# Patient Record
Sex: FEMALE | Race: WHITE | Employment: FULL TIME | URBAN - METROPOLITAN AREA
[De-identification: names, ages, dates, MRNs, and addresses within clinical notes are randomized per-mention and may not be internally consistent; named-entity substitution may affect disease eponyms.]

---

## 2018-01-03 ENCOUNTER — OFFICE VISIT (OUTPATIENT)
Dept: URGENT CARE | Facility: URGENT CARE | Age: 48
End: 2018-01-03
Payer: COMMERCIAL

## 2018-01-03 VITALS
TEMPERATURE: 97.8 F | SYSTOLIC BLOOD PRESSURE: 172 MMHG | DIASTOLIC BLOOD PRESSURE: 95 MMHG | WEIGHT: 185.6 LBS | HEART RATE: 90 BPM | RESPIRATION RATE: 20 BRPM

## 2018-01-03 DIAGNOSIS — M54.6 ACUTE RIGHT-SIDED THORACIC BACK PAIN: Primary | ICD-10-CM

## 2018-01-03 DIAGNOSIS — Z72.0 TOBACCO ABUSE: ICD-10-CM

## 2018-01-03 DIAGNOSIS — R03.0 ELEVATED BLOOD PRESSURE READING WITHOUT DIAGNOSIS OF HYPERTENSION: ICD-10-CM

## 2018-01-03 PROCEDURE — 99204 OFFICE O/P NEW MOD 45 MIN: CPT | Performed by: FAMILY MEDICINE

## 2018-01-03 RX ORDER — IBUPROFEN 800 MG/1
800 TABLET, FILM COATED ORAL EVERY 8 HOURS PRN
Qty: 21 TABLET | Refills: 0 | Status: SHIPPED | OUTPATIENT
Start: 2018-01-03 | End: 2018-01-10

## 2018-01-03 RX ORDER — METHOCARBAMOL 750 MG/1
750 TABLET, FILM COATED ORAL 4 TIMES DAILY PRN
Qty: 28 TABLET | Refills: 0 | Status: SHIPPED | OUTPATIENT
Start: 2018-01-03 | End: 2018-01-10

## 2018-01-03 RX ORDER — HYDROCODONE BITARTRATE AND ACETAMINOPHEN 5; 325 MG/1; MG/1
1 TABLET ORAL EVERY 6 HOURS PRN
Qty: 12 TABLET | Refills: 0 | Status: SHIPPED | OUTPATIENT
Start: 2018-01-03 | End: 2018-01-06

## 2018-01-03 NOTE — MR AVS SNAPSHOT
"              After Visit Summary   1/3/2018    Kassie Gallardo    MRN: [id number]           Patient Information     Date Of Birth          1970        Visit Information        Provider Department      1/3/2018 12:55 PM Jarred Pedraza,  [facility]        Today's Diagnoses     Acute right-sided thoracic back pain    -  1       Follow-ups after your visit        Who to contact     If you have questions or need follow up information about today's clinic visit or your schedule please contact [facility] directly at [phone].  Normal or non-critical lab and imaging results will be communicated to you by SPARQhart, letter or phone within 4 business days after the clinic has received the results. If you do not hear from us within 7 days, please contact the clinic through SPARQhart or phone. If you have a critical or abnormal lab result, we will notify you by phone as soon as possible.  Submit refill requests through [employer] or call your pharmacy and they will forward the refill request to us. Please allow 3 business days for your refill to be completed.          Additional Information About Your Visit        MyChart Information     [employer] lets you send messages to your doctor, view your test results, renew your prescriptions, schedule appointments and more. To sign up, go to [email or web address] . Click on \"Log in\" on the left side of the screen, which will take you to the Welcome page. Then click on \"Sign up Now\" on the right side of the page.     You will be asked to enter the access code listed below, as well as some personal information. Please follow the directions to create your username and password.     Your access code is: [email or web address]  Expires: 4/3/2018  2:09 PM     Your access code will  in 90 days. If you need help or a new code, please call your [place] clinic or [phone].        Care EveryWhere ID     This is your Care EveryWhere ID. " This could be used by other organizations to access your [facility] medical records  [id number]        Your Vitals Were     Pulse Temperature Respirations             90 97.8  F (36.6  C) (Oral) 20          Blood Pressure from Last 3 Encounters:   01/03/18 (!) 172/95    Weight from Last 3 Encounters:   01/03/18 185 lb 9.6 oz (84.2 kg)              Today, you had the following     No orders found for display         Today's Medication Changes          These changes are accurate as of: 1/3/18  2:09 PM.  If you have any questions, ask your nurse or doctor.               Start taking these medicines.        Dose/Directions    HYDROcodone-acetaminophen 5-325 MG per tablet   Commonly known as:  NORCO   Used for:  Acute right-sided thoracic back pain   Started by:  Jarred Pedraza DO        Dose:  1 tablet   Take 1 tablet by mouth every 6 hours as needed   Quantity:  12 tablet   Refills:  0       ibuprofen 800 MG tablet   Commonly known as:  ADVIL/MOTRIN   Used for:  Acute right-sided thoracic back pain   Started by:  Jarred Pedraza DO        Dose:  800 mg   Take 1 tablet (800 mg) by mouth every 8 hours as needed for moderate pain   Quantity:  21 tablet   Refills:  0       methocarbamol 750 MG tablet   Commonly known as:  ROBAXIN   Used for:  Acute right-sided thoracic back pain   Started by:  Jarred Pedraza DO        Dose:  750 mg   Take 1 tablet (750 mg) by mouth 4 times daily as needed for muscle spasms   Quantity:  28 tablet   Refills:  0            Where to get your medicines      These medications were sent to [facility] [address] [address]     Phone:  [phone]     ibuprofen 800 MG tablet    methocarbamol 750 MG tablet         Some of these will need a paper prescription and others can be bought over the counter.  Ask your nurse if you have questions.     Bring a paper prescription for each of these medications      HYDROcodone-acetaminophen 5-325 MG per tablet                Primary Care Provider Fax #    Physician No Ref-Primary [phone]       No address on file        Equal Access to Services     SUSI SAAVEDRA : Hadmague aad ku hadjanie Rader, renzo veenagladys, danielle méndez, meliton barajasnya waterman. So [facility] [phone].    ATENCIÓN: Si habla español, tiene a chairez disposición servicios gratuitos de asistencia lingüística. Llame al [phone].    We comply with applicable federal civil rights laws and [place] laws. We do not discriminate on the basis of race, color, national origin, age, disability, sex, sexual orientation, or gender identity.            Thank you!     Thank you for choosing [facility]  for your care. Our goal is always to provide you with excellent care. Hearing back from our patients is one way we can continue to improve our services. Please take a few minutes to complete the written survey that you may receive in the mail after your visit with us. Thank you!             Your Updated Medication List - Protect others around you: Learn how to safely use, store and throw away your medicines at www.disposemymeds.org.          This list is accurate as of: 1/3/18  2:09 PM.  Always use your most recent med list.                   Brand Name Dispense Instructions for use Diagnosis    HYDROcodone-acetaminophen 5-325 MG per tablet    NORCO    12 tablet    Take 1 tablet by mouth every 6 hours as needed    Acute right-sided thoracic back pain       ibuprofen 800 MG tablet    ADVIL/MOTRIN    21 tablet    Take 1 tablet (800 mg) by mouth every 8 hours as needed for moderate pain    Acute right-sided thoracic back pain       methocarbamol 750 MG tablet    ROBAXIN    28 tablet    Take 1 tablet (750 mg) by mouth 4 times daily as needed for muscle spasms    Acute right-sided thoracic back pain

## 2018-01-03 NOTE — PROGRESS NOTES
SUBJECTIVE:  Chief Complaint   Patient presents with     Shoulder Pain     Rt shoulder pain x 1 week ago    .ident who presents with a chief complaint of  right upper back pain.  Symptoms began 1 week(s) ago , are moderate andstill present.  Context:Injury: no  Pain exacerbated by movement and sleeping on the effected side   Relieved bynothing She treated it initially with Tylenol.   This is the first time this type of injury has occurred to this patient.     Past Medical History:   Diagnosis Date     NO ACTIVE PROBLEMS        Past Surgical History:   Procedure Laterality Date     GYN SURGERY         Family History   Problem Relation Age of Onset     Family History Negative No family hx of        Social History   Substance Use Topics     Smoking status: Current Some Day Smoker     Smokeless tobacco: Never Used     Alcohol use Not on file     Review Of Systems  Skin: negative  Eyes: negative  Ears/Nose/Throat: negative  Respiratory: No shortness of breath, dyspnea on exertion, cough, or hemoptysis  Cardiovascular: negative  Gastrointestinal: negative  Genitourinary: negative  Musculoskeletal: as above  Neurologic: negative  Psychiatric: negative  Hematologic/Lymphatic/Immunologic: negative  Endocrine: negative    EXAM: BP (!) 172/95  Pulse 90  Temp 97.8  F (36.6  C) (Oral)  Resp 20  Wt 185 lb 9.6 oz (84.2 kg)    Exam:rt upper back, rhomboids muscles withy spasm and reproducible pain with palpation and rom  GENERAL APPEARANCE: healthy, alert and no distress  NECK: supple, non-tender to palpation, FROM   CHEST: clear to auscultation  CV: regular rate and rhythm  EXTREMITIES: peripheral pulses normal  SKIN: no suspicious lesions or rashes  NEURO: Normal strength and tone, sensory exam grossly normal, mentation intact and speech normal  HEENT clear    ASSESSMENT:     ICD-10-CM    1. Acute right-sided thoracic back pain M54.6 methocarbamol (ROBAXIN) 750 MG tablet     ibuprofen (ADVIL/MOTRIN) 800 MG tablet      HYDROcodone-acetaminophen (NORCO) 5-325 MG per tablet   2. Elevated blood pressure reading without diagnosis of hypertension R03.0    3. Tobacco abuse Z72.0      Quit tobacco  Doubt cardiac or dissection/PE/Respiratory issues    Ice/heat  F/u PCP

## 2018-01-03 NOTE — NURSING NOTE
Chief Complaint   Patient presents with     Shoulder Pain     Rt shoulder pain x 1 week ago        Initial BP (!) 172/95  Pulse 90  Temp 97.8  F (36.6  C) (Oral)  Resp 20  Wt 185 lb 9.6 oz (84.2 kg) There is no height or weight on file to calculate BMI.  Medication Reconciliation: complete

## 2018-01-11 ENCOUNTER — OFFICE VISIT (OUTPATIENT)
Dept: INTERNAL MEDICINE | Facility: CLINIC | Age: 48
End: 2018-01-11
Payer: COMMERCIAL

## 2018-01-11 VITALS
HEART RATE: 102 BPM | BODY MASS INDEX: 27.89 KG/M2 | TEMPERATURE: 98.6 F | HEIGHT: 68 IN | WEIGHT: 184 LBS | DIASTOLIC BLOOD PRESSURE: 100 MMHG | SYSTOLIC BLOOD PRESSURE: 174 MMHG | OXYGEN SATURATION: 98 %

## 2018-01-11 DIAGNOSIS — M25.511 ACUTE PAIN OF RIGHT SHOULDER: ICD-10-CM

## 2018-01-11 DIAGNOSIS — M89.8X1 PAIN OF RIGHT SCAPULA: Primary | ICD-10-CM

## 2018-01-11 DIAGNOSIS — Z23 NEED FOR PROPHYLACTIC VACCINATION AND INOCULATION AGAINST INFLUENZA: ICD-10-CM

## 2018-01-11 DIAGNOSIS — R03.0 ELEVATED BLOOD PRESSURE READING WITHOUT DIAGNOSIS OF HYPERTENSION: ICD-10-CM

## 2018-01-11 PROCEDURE — 99214 OFFICE O/P EST MOD 30 MIN: CPT | Performed by: INTERNAL MEDICINE

## 2018-01-11 RX ORDER — METHOCARBAMOL 750 MG/1
750 TABLET, FILM COATED ORAL 4 TIMES DAILY PRN
Qty: 28 TABLET | Refills: 0 | Status: SHIPPED | OUTPATIENT
Start: 2018-01-11

## 2018-01-11 RX ORDER — DICLOFENAC SODIUM 75 MG/1
75 TABLET, DELAYED RELEASE ORAL 2 TIMES DAILY
Qty: 28 TABLET | Refills: 0 | Status: SHIPPED | OUTPATIENT
Start: 2018-01-11 | End: 2018-01-18

## 2018-01-11 NOTE — MR AVS SNAPSHOT
After Visit Summary   1/11/2018    Kassie Gallardo    MRN: [id number]           Patient Information     Date Of Birth          1970        Visit Information        Provider Department      1/11/2018 1:40 PM Ronak Lyon MD [facility]        Today's Diagnoses     Pain of right scapula    -  1    Acute pain of right shoulder        Need for prophylactic vaccination and inoculation against influenza        Elevated blood pressure reading without diagnosis of hypertension           Follow-ups after your visit        Additional Services     [facility] PT, HAND, AND CHIROPRACTIC REFERRAL       **This order will print in the [facility] Scheduling Office**    Physical Therapy, Hand Therapy and Chiropractic Care are available through:    *[facility]  *[facility]  *[facility]    Call one number to schedule at any of the above locations: [phone].    Your provider has referred you to: Physical Therapy at [facility] or [facility]    Indication/Reason for Referral: Shoulder Pain  Onset of Illness: 12/18  Therapy Orders: Evaluate and Treat  Special Programs: None  Special Request: None    Janell Maciel      Additional Comments for the Therapist or Chiropractor:     Please be aware that coverage of these services is subject to the terms and limitations of your health insurance plan.  Call member services at your health plan with any benefit or coverage questions.      Please bring the following to your appointment:    *Your personal calendar for scheduling future appointments  *Comfortable clothing            SPORTS MEDICINE REFERRAL       Your provider has referred you to:  [facility]: [facility]  [phone]   [email or web address]    Please be aware that coverage of these services is subject to the terms and limitations of  "your health insurance plan.  Call member services at your health plan with any benefit or coverage questions.      Please bring the following to your appointment:    >>   Any x-rays, CTs or MRIs which have been performed.  Contact the facility where they were done to arrange for  prior to your scheduled appointment.    >>   List of current medications   >>   This referral request   >>   Any documents/labs given to you for this referral                  Who to contact     If you have questions or need follow up information about today's clinic visit or your schedule please contact [facility] directly at [phone].  Normal or non-critical lab and imaging results will be communicated to you by MyChart, letter or phone within 4 business days after the clinic has received the results. If you do not hear from us within 7 days, please contact the clinic through [employer] or phone. If you have a critical or abnormal lab result, we will notify you by phone as soon as possible.  Submit refill requests through [employer] or call your pharmacy and they will forward the refill request to us. Please allow 3 business days for your refill to be completed.          Additional Information About Your Visit        MyChart Information     [employer] lets you send messages to your doctor, view your test results, renew your prescriptions, schedule appointments and more. To sign up, go to [email or web address] . Click on \"Log in\" on the left side of the screen, which will take you to the Welcome page. Then click on \"Sign up Now\" on the right side of the page.     You will be asked to enter the access code listed below, as well as some personal information. Please follow the directions to create your username and password.     Your access code is: [email or web address]  Expires: 4/3/2018  2:09 PM     Your access code will  in 90 days. If you need help or a new code, please call your [facility] or " "[phone].        Care EveryWhere ID     This is your Care EveryWhere ID. This could be used by other organizations to access your [facility] medical records  [id number]        Your Vitals Were     Pulse Temperature Height Pulse Oximetry BMI (Body Mass Index)       102 98.6  F (37  C) (Oral) 5' 8\" (1.727 m) 98% 27.98 kg/m2        Blood Pressure from Last 3 Encounters:   01/11/18 (!) 174/100   01/03/18 (!) 172/95    Weight from Last 3 Encounters:   01/11/18 184 lb (83.5 kg)   01/03/18 185 lb 9.6 oz (84.2 kg)              We Performed the Following     FLU VAC, SPLIT VIRUS IM > 3 YO (QUADRIVALENT) [36532]     [facility] PT, HAND, AND CHIROPRACTIC REFERRAL     SPORTS MEDICINE REFERRAL     Vaccine Administration, Initial [20425]          Today's Medication Changes          These changes are accurate as of: 1/11/18  2:39 PM.  If you have any questions, ask your nurse or doctor.               Start taking these medicines.        Dose/Directions    diclofenac 75 MG EC tablet   Commonly known as:  VOLTAREN   Used for:  Pain of right scapula, Acute pain of right shoulder   Started by:  Ronak Lyon MD        Dose:  75 mg   Take 1 tablet (75 mg) by mouth 2 times daily   Quantity:  28 tablet   Refills:  0         These medicines have changed or have updated prescriptions.        Dose/Directions    methocarbamol 750 MG tablet   Commonly known as:  ROBAXIN   This may have changed:    - medication strength  - when to take this  - reasons to take this   Used for:  Pain of right scapula, Acute pain of right shoulder   Changed by:  Ronak Lyon MD        Dose:  750 mg   Take 1 tablet (750 mg) by mouth 4 times daily as needed   Quantity:  28 tablet   Refills:  0            Where to get your medicines      These medications were sent to [facility] [address] [address]     Phone:  [phone]     diclofenac 75 MG EC tablet    methocarbamol 750 MG tablet "                Primary Care Provider    None Specified       No primary provider on file.        Equal Access to Services     SUSI SAAVEDRA : Hadii aad ku hadgracielalo Rader, waisabelarosana heck, allantin robertsmameliton avilez. So [facility] [phone].    ATENCIÓN: Si habla español, tiene a chairez disposición servicios gratuitos de asistencia lingüística. Ll[facility] [phone].    We comply with applicable federal civil rights laws and [place] laws. We do not discriminate on the basis of race, color, national origin, age, disability, sex, sexual orientation, or gender identity.            Thank you!     Thank you for choosing [facility]  for your care. Our goal is always to provide you with excellent care. Hearing back from our patients is one way we can continue to improve our services. Please take a few minutes to complete the written survey that you may receive in the mail after your visit with us. Thank you!             Your Updated Medication List - Protect others around you: Learn how to safely use, store and throw away your medicines at www.disposemymeds.org.          This list is accurate as of: 1/11/18  2:39 PM.  Always use your most recent med list.                   Brand Name Dispense Instructions for use Diagnosis    diclofenac 75 MG EC tablet    VOLTAREN    28 tablet    Take 1 tablet (75 mg) by mouth 2 times daily    Pain of right scapula, Acute pain of right shoulder       IBUPROFEN PO      Take 800 mg by mouth        methocarbamol 750 MG tablet    ROBAXIN    28 tablet    Take 1 tablet (750 mg) by mouth 4 times daily as needed    Pain of right scapula, Acute pain of right shoulder

## 2018-01-11 NOTE — PROGRESS NOTES
"  SUBJECTIVE:   Kassie Gallardo is a 47 year old female who presents to clinic today for the following health issues:    Back/R shoulder  Pain       Duration: 3 weeks        Specific cause: none    Description:   Location of pain: shoulders right  Character of pain: throbbing, shooting pain   Pain radiation:down the R arm   New numbness or weakness in legs, not attributed to pain:  YES, the R arm    Intensity: Currently 7/10    History:   Pain interferes with job: YES  History of back problems: no prior back problems  Any previous MRI or X-rays: None  Sees a specialist for back pain:  No  Therapies tried without relief: cold, heat, massage, muscle relaxants, opioids, sitting and stretch    Alleviating factors:   Improved by: rest, heat and ice    Precipitating factors:  Worsened by: Lifting and Standing    Functional and Psychosocial Screen ([email or web address] STarT Back):      Not performed today    Problem list and histories reviewed & adjusted, as indicated.  Additional history: as documented    Labs reviewed in EPIC    Reviewed and updated as needed this visit by clinical staff  Tobacco  Allergies  Soc Hx      Reviewed and updated as needed this visit by Provider         ROS:  Constitutional, HEENT, cardiovascular, pulmonary, gi and gu systems are negative, except as otherwise noted.      OBJECTIVE:                                                    BP (!) 174/100  Pulse 102  Temp 98.6  F (37  C) (Oral)  Ht 5' 8\" (1.727 m)  Wt 184 lb (83.5 kg)  SpO2 98%  BMI 27.98 kg/m2  Body mass index is 27.98 kg/(m^2).  GENERAL APPEARANCE: alert, over weight, hold R arm in flexion at elbow and up against her L chest for comfort  HENT: nose and mouth without ulcers or lesions  NECK: no adenopathy, no asymmetry, masses, or scars and thyroid normal to palpation  RESP: lungs clear to auscultation - no rales, rhonchi or wheezes  CV: regular rates and rhythm, normal S1 S2, no S3 or S4 and no murmur, click or rub  MS: tender along the medial " side of R scapula. Pain with supination and pronation against resistance. Pain felt in the shoulder area. Pain with shoulder abduction against resistance. Cervical flexion causes some pain in different locaion - more along the R clavicle.   SKIN: no suspicious lesions or rashes    Diagnostic test results:  none        ASSESSMENT/PLAN:                                                    1. Pain of right scapula  2. Acute pain of right shoulder  Seems most cw. MSK pain in /around the shoulder rather than a cervical spine or GH joint issue.   Try PT w/better pain mgmt, if no improvement then see sports med for their opinion   - [facility] PT, HAND, AND CHIROPRACTIC REFERRAL  - diclofenac (VOLTAREN) 75 MG EC tablet; Take 1 tablet (75 mg) by mouth 2 times daily  Dispense: 28 tablet; Refill: 0  - methocarbamol (ROBAXIN) 750 MG tablet; Take 1 tablet (750 mg) by mouth 4 times daily as needed  Dispense: 28 tablet; Refill: 0  - SPORTS MEDICINE REFERRAL    3. Need for prophylactic vaccination and inoculation against influenza  - FLU VAC, SPLIT VIRUS IM > 3 YO (QUADRIVALENT) [63971]  - Vaccine Administration, Initial [31134]    4. Elevated blood pressure reading without diagnosis of hypertension  Some of this may be related to pain -impossible to know at this point but this is likely.  I recommend f/u here after her shoulder sx have improved for a recheck in near future    Ronak Lyon MD  [facility]    Injectable Influenza Immunization Documentation    1.  Is the person to be vaccinated sick today?   No    2. Does the person to be vaccinated have an allergy to a component   of the vaccine?   No  Egg Allergy Algorithm Link    3. Has the person to be vaccinated ever had a serious reaction   to influenza vaccine in the past?   No    4. Has the person to be vaccinated ever had Guillain-Barré syndrome?   No    Form completed by Alexandria Burleson MA

## 2018-01-16 ENCOUNTER — THERAPY VISIT (OUTPATIENT)
Dept: PHYSICAL THERAPY | Facility: CLINIC | Age: 48
End: 2018-01-16
Payer: COMMERCIAL

## 2018-01-16 DIAGNOSIS — M54.12 RIGHT CERVICAL RADICULOPATHY: Primary | ICD-10-CM

## 2018-01-16 PROCEDURE — 97161 PT EVAL LOW COMPLEX 20 MIN: CPT | Mod: GP | Performed by: PHYSICAL THERAPIST

## 2018-01-16 PROCEDURE — 97110 THERAPEUTIC EXERCISES: CPT | Mod: GP | Performed by: PHYSICAL THERAPIST

## 2018-01-16 PROCEDURE — 97112 NEUROMUSCULAR REEDUCATION: CPT | Mod: GP | Performed by: PHYSICAL THERAPIST

## 2018-01-16 NOTE — PROGRESS NOTES
[facility] Initial Evaluation  Subjective:  Patient is a 47 year old female presenting with rehab cervical spine hpi. The history is provided by the patient. No  was used.   Kassie Gallardo is a 47 year old female with a cervical spine condition.  Condition occurred with:  Insidious onset.  Condition occurred: for unknown reasons.  This is a new condition  Approximately 12-16-17 she awoke with stiff neck and R arm, felt she slept wrong. Developed pain in R scapula. Currently has constant burning in R scapular and UT, and intermittent sharp, stabbing in R scapula, tingling in R upper arm posterior. Pain increases with sitting greater than 5 minutes, lying on sides, looking up or down. Has to lie flat on back. Missing 3 hours sleep.    Patient reports pain:  Cervical right side.  Radiates to:  Shoulder right and upper arm right.  Pain is described as sharp and burning and is constant and intermittent (constant burning, intermittent sharp) and reported as 10/10 and 4/10.  Associated symptoms:  Tingling. Pain is worse in the P.M..  Symptoms are exacerbated by looking up or down, sitting and lying down and relieved by other (avoiding sitting, placing R hand overhead).  Since onset symptoms are gradually worsening.        General health as reported by patient is good.                  Barriers include:  None as reported by the patient.    Red flags:  None as reported by the patient.                        Objective:  System              Cervical/Thoracic Evaluation    AROM:  AROM Cervical:    Flexion:            66% (R scapular pain)  Extension:       66% (R scapular pain)  Rotation:         Left: 90% (R scapula)     Right: 100%  Side Bend:      Left: 100%     Right:  100%      Headaches: cervical (2x/week)  Cervical Myotomes:  not assessed                  DTR's:  not assessed          Cervical Dermatomes:  not assessed                                                                       [facility] Cervical Evaluation    Posture:  Sitting: fair  Standing: fair  Protruding Head: yes  Wry Neck: no  Correction of Posture: no effect    Movement Loss:    Flexion (Flex): min and pain    Extension (EXT): min and pain  Lateral Flexion Right (LF R): nil  Lateral Flexion Left (LF L): nil  Rotation Right (ROT R): nil  Rotation Left (ROT L): min and pain  Test Movements:  Pretest Pain Sittin/10 burning in R scapula    RET: During: no effect    Repeat RET: During: no effect  After: worse  Mechanical Response: DecrROM          Pretest Pain Sittin/10 burning in R scapula  LF R: During: no effect    Repeat LF R: During: no effect  After: worse  Mechanical Response: no effect  LF L: During: no effect    Repeat LF L: During: no effect  After: worse  Mechanical Response: no effect  Rot R: During: no effect    Repeat Rot R: During: increases  After: worse  Mechanical Response: no effect    Flex: During: increases    Repeat Flex: During: increases   After: worse   Mechanical Response: IncROM      Conclusion: derangement  Principle of Treatment:  Posture Correction: yes    Extension: supine retraction 1-2 sets of 10 every 2 hours                                             ROS    Assessment/Plan:    Patient is a 47 year old female with cervical and right side shoulder complaints.  She appears to have a cervical radiculoapthy. Most neck movements today increased her R scapular pain/burning. She was able to perform supine retractions and decrease her pain. Her sx's were highly irritable today. We discussed the importance of posture on her recovery.     Patient has the following significant findings with corresponding treatment plan.                Diagnosis 1:  R scapular pain/cervical radiculopathy  Pain -  directional preference exercise  Decreased ROM/flexibility - manual therapy and therapeutic exercise  Decreased function - therapeutic activities  Impaired posture - neuro re-education    Therapy  Evaluation Codes:   1) History comprised of:   Personal factors that impact the plan of care:      None.    Comorbidity factors that impact the plan of care are:      None.     Medications impacting care: None.  2) Examination of Body Systems comprised of:   Body structures and functions that impact the plan of care:      Cervical spine and Shoulder.   Activity limitations that impact the plan of care are:      Bathing, Bending, Driving, Dressing, Sitting, Sleeping and Laying down.  3) Clinical presentation characteristics are:   Stable/Uncomplicated.  4) Decision-Making    Low complexity using standardized patient assessment instrument and/or measureable assessment of functional outcome.  Cumulative Therapy Evaluation is: Low complexity.    Previous and current functional limitations:  (See Goal Flow Sheet for this information)    Short term and Long term goals: (See Goal Flow Sheet for this information)     Communication ability:  Patient appears to be able to clearly communicate and understand verbal and written communication and follow directions correctly.  Treatment Explanation - The following has been discussed with the patient:   RX ordered/plan of care  Anticipated outcomes  Possible risks and side effects  This patient would benefit from PT intervention to resume normal activities.   Rehab potential is good.    Frequency:  1 X week, once daily  Duration:  for 8 weeks  Discharge Plan:  Achieve all LTG.  Independent in home treatment program.  Reach maximal therapeutic benefit.    Please refer to the daily flowsheet for treatment today, total treatment time and time spent performing 1:1 timed codes.

## 2018-01-16 NOTE — MR AVS SNAPSHOT
"              After Visit Summary   1/16/2018    Kassie Gallardo    MRN: [id number]           Patient Information     Date Of Birth          1970        Visit Information        Provider Department      1/16/2018 8:50 AM Lia Marley PT [facility] Physical Therapy        Today's Diagnoses     Right cervical radiculopathy    -  1       Follow-ups after your visit        Your next 10 appointments already scheduled     Jan 23, 2018  8:50 AM CST   JOMAR Extremity with Lia Marley PT   [facility] Physical Therapy ([facility]  )    [address] [place] [address]  [facility] [address]   [phone]            Jan 30, 2018  8:50 AM CST   JOMAR Extremity with Lia Marley PT   [facility] ([facility]  )    [address] [place] [address]  [facility] [id number] [phone]              Who to contact     If you have questions or need follow up information about today's clinic visit or your schedule please contact [facility] PHYS[facility] THERAPY directly at [phone].  Normal or non-critical lab and imaging results will be communicated to you by "[employer]hart, letter or phone within 4 business days after the clinic has received the results. If you do not hear from us within 7 days, please contact the clinic through "[employer]hart or phone. If you have a critical or abnormal lab result, we will notify you by phone as soon as possible.  Submit refill requests through [employer] or call your pharmacy and they will forward the refill request to us. Please allow 3 business days for your refill to be completed.          Additional Information About Your Visit        MyChart Information     [employer] lets you send messages to your doctor, view your test results, renew your prescriptions, schedule appointments and more. To sign up, go to [email or web address] . Click on \"Log in\" " "on the left side of the screen, which will take you to the Welcome page. Then click on \"Sign up Now\" on the right side of the page.     You will be asked to enter the access code listed below, as well as some personal information. Please follow the directions to create your username and password.     Your access code is: [email or web address]  Expires: 4/3/2018  2:09 PM     Your access code will  in 90 days. If you need help or a new code, please call your [facility] clinic or [phone].        Care EveryWhere ID     This is your Care EveryWhere ID. This could be used by other organizations to access your [facility] medical records  [id number]         Blood Pressure from Last 3 Encounters:   18 (!) 174/100   18 (!) 172/95    Weight from Last 3 Encounters:   18 83.5 kg (184 lb)   18 84.2 kg (185 lb 9.6 oz)              We Performed the Following     HC PT EVAL, LOW COMPLEXITY     JOMAR INITIAL EVAL REPORT     NEUROMUSCULAR RE-EDUCATION     THERAPEUTIC EXERCISES        Primary Care Provider Office Phone # Fax #    Ronak Lyon [phone]       [address] [place] [phone]        Equal Access to Services     SUSI SAAVEDRA AH: Hadii luisana ku hadasho Soomaali, waaxda luqadaha, qaybta kaalmada adeegyada, waxay saurabhin hayandrewn betty kwon . So [facility] [phone].    ATENCIÓN: Si habla español, tiene a chairez disposición servicios gratuitos de asistencia lingüística. Llame al [phone].    We comply with applicable federal civil rights laws and [place] laws. We do not discriminate on the basis of race, color, national origin, age, disability, sex, sexual orientation, or gender identity.            Thank you!     Thank you for choosing [facility] PHYSICAL THERAP[facility]  for your care. Our goal is always to provide you with excellent care. Hearing back from our patients is one way we can continue to improve our services. Please take a few " minutes to complete the written survey that you may receive in the mail after your visit with us. Thank you!             Your Updated Medication List - Protect others around you: Learn how to safely use, store and throw away your medicines at www.disposemymeds.org.          This list is accurate as of: 1/16/18 10:52 AM.  Always use your most recent med list.                   Brand Name Dispense Instructions for use Diagnosis    diclofenac 75 MG EC tablet    VOLTAREN    28 tablet    Take 1 tablet (75 mg) by mouth 2 times daily    Pain of right scapula, Acute pain of right shoulder       IBUPROFEN PO      Take 800 mg by mouth        methocarbamol 750 MG tablet    ROBAXIN    28 tablet    Take 1 tablet (750 mg) by mouth 4 times daily as needed    Pain of right scapula, Acute pain of right shoulder

## 2018-01-18 ENCOUNTER — OFFICE VISIT (OUTPATIENT)
Dept: INTERNAL MEDICINE | Facility: CLINIC | Age: 48
End: 2018-01-18
Payer: COMMERCIAL

## 2018-01-18 VITALS
BODY MASS INDEX: 28.13 KG/M2 | DIASTOLIC BLOOD PRESSURE: 89 MMHG | TEMPERATURE: 98.1 F | OXYGEN SATURATION: 98 % | HEART RATE: 86 BPM | WEIGHT: 185 LBS | SYSTOLIC BLOOD PRESSURE: 145 MMHG

## 2018-01-18 DIAGNOSIS — M25.511 PAIN OF RIGHT SHOULDER REGION: Primary | ICD-10-CM

## 2018-01-18 PROCEDURE — 99213 OFFICE O/P EST LOW 20 MIN: CPT | Performed by: PHYSICIAN ASSISTANT

## 2018-01-18 RX ORDER — METHYLPREDNISOLONE 4 MG
TABLET, DOSE PACK ORAL
Qty: 21 TABLET | Refills: 0 | Status: SHIPPED | OUTPATIENT
Start: 2018-01-18

## 2018-01-18 NOTE — MR AVS SNAPSHOT
After Visit Summary   1/18/2018    Kassie Gallardo    MRN: [id number]           Patient Information     Date Of Birth          1970        Visit Information        Provider Department      1/18/2018 6:40 PM Tanika Gregorio PA-C [facility]        Today's Diagnoses     Pain of right shoulder region    -  1      Care Instructions    Stop diclofenac     Start medrol dose pack     Continue with physical therapy    If no improvement, notify us, sport med referral           Follow-ups after your visit        Your next 10 appointments already scheduled     Jan 23, 2018  8:50 AM CST   JOMAR Extremity with Lia Marley PT   [facility] ([facility]  )    [address] [place] [address]  [facility] [id number] [phone]            Jan 30, 2018  8:50 AM CST   JOMAR Extremity with Lia Marley PT   [facility] ([facility]  )    [address] [place] [address]  [facility] [id number] [phone]              Who to contact     If you have questions or need follow up information about today's clinic visit or your schedule please contact [facility] directly at [phone].  Normal or non-critical lab and imaging results will be communicated to you by MyChart, letter or phone within 4 business days after the clinic has received the results. If you do not hear from us within 7 days, please contact the clinic through MyChart or phone. If you have a critical or abnormal lab result, we will notify you by phone as soon as possible.  Submit refill requests through [employer] or call your pharmacy and they will forward the refill request to us. Please allow 3 business days for your refill to be completed.          Additional Information About Your Visit        [employer] Information     [employer] lets you send messages to your doctor, view your test results, renew  "your prescriptions, schedule appointments and more. To sign up, go to [email or web address] . Click on \"Log in\" on the left side of the screen, which will take you to the Welcome page. Then click on \"Sign up Now\" on the right side of the page.     You will be asked to enter the access code listed below, as well as some personal information. Please follow the directions to create your username and password.     Your access code is: [email or web address]  Expires: 4/3/2018  2:09 PM     Your access code will  in 90 days. If you need help or a new code, please call your [facility] clinic or [phone].        Care EveryWhere ID     This is your Care EveryWhere ID. This could be used by other organizations to access your [facility] medical records  [id number]        Your Vitals Were     Pulse Temperature Pulse Oximetry BMI (Body Mass Index)          86 98.1  F (36.7  C) (Oral) 98% 28.13 kg/m2         Blood Pressure from Last 3 Encounters:   18 145/89   18 (!) 174/100   18 (!) 172/95    Weight from Last 3 Encounters:   18 185 lb (83.9 kg)   18 184 lb (83.5 kg)   18 185 lb 9.6 oz (84.2 kg)              Today, you had the following     No orders found for display         Today's Medication Changes          These changes are accurate as of: 18  7:14 PM.  If you have any questions, ask your nurse or doctor.               Start taking these medicines.        Dose/Directions    methylPREDNISolone 4 MG tablet   Commonly known as:  MEDROL DOSEPAK   Used for:  Pain of right shoulder region   Started by:  Tanika Gregorio PA-C        Follow package instructions   Quantity:  21 tablet   Refills:  0            Where to get your medicines      These medications were sent to [facility] [address] [address]     Phone:  [phone]     methylPREDNISolone 4 MG tablet                Primary Care Provider Office Phone # Fax # "    Ronak Lyon [phone]       [address] [facility] [address]        Equal Access to Services     SUSI SAAVEDRA : Hadmague luisana gutierrez octavia Rader, waisabelada luqadaha, qamatthieuta kaalmada honey, meliton carias laquitanya waterman. So [facility] [phone].    ATENCIÓN: Si habla español, tiene a chairez disposición servicios gratuitos de asistencia lingüística. Llame al [phone].    We comply with applicable federal civil rights laws and [place] laws. We do not discriminate on the basis of race, color, national origin, age, disability, sex, sexual orientation, or gender identity.            Thank you!     Thank you for choosing [facility]  for your care. Our goal is always to provide you with excellent care. Hearing back from our patients is one way we can continue to improve our services. Please take a few minutes to complete the written survey that you may receive in the mail after your visit with us. Thank you!             Your Updated Medication List - Protect others around you: Learn how to safely use, store and throw away your medicines at www.disposemymeds.org.          This list is accurate as of: 1/18/18  7:14 PM.  Always use your most recent med list.                   Brand Name Dispense Instructions for use Diagnosis    IBUPROFEN PO      Take 800 mg by mouth        methocarbamol 750 MG tablet    ROBAXIN    28 tablet    Take 1 tablet (750 mg) by mouth 4 times daily as needed    Pain of right scapula, Acute pain of right shoulder       methylPREDNISolone 4 MG tablet    MEDROL DOSEPAK    21 tablet    Follow package instructions    Pain of right shoulder region

## 2018-01-19 NOTE — PROGRESS NOTES
SUBJECTIVE:   Kassie Gallardo is a 47 year old female who presents to clinic today for the following health issues:      Neck Pain- recheck from 1/11/18      Duration: 1 month- recheck from 1/11/18    Description:  Location: R neck   Radiation: into the right shoulder and into the right forearm    Intensity:  severe    Accompanying signs and symptoms: Tingling in R arm     History (similar episodes/previous evaluation): Seen by PT 1/16/18- patient states she is unable to do exercises prescribed by therapy and therapist had recommended steroid taper because of possible pinched nerve     Precipitating or alleviating factors: None    Therapies tried and outcome: Robaxin, Voltaren- minimal relief     Pt notes she had no injury to the neck she just woke up wrong.   Pt was evaluated on 01/11/18 and recommended trial of PT and referral to sports med if no improvement. Pt was started on diclofenac and has not had relief with this. Pt notes physical therapy was difficult and recommendation was made for steroid taper to hopefully allow for pt to better be able to complete the PT program.      Problem list and histories reviewed & adjusted, as indicated.  Additional history: as documented      Reviewed and updated as needed this visit by clinical staffTobacco  Allergies  Meds  Problems  Med Hx  Surg Hx  Fam Hx  Soc Hx        Reviewed and updated as needed this visit by Provider  Meds  Problems         OBJECTIVE:     /89  Pulse 86  Temp 98.1  F (36.7  C) (Oral)  Wt 185 lb (83.9 kg)  SpO2 98%  BMI 28.13 kg/m2  Body mass index is 28.13 kg/(m^2).  GENERAL: healthy, alert and no distress  MS:Limited ROM throughout the neck, pain to the right of side of the neck and into the supraspinatus and infraspinatus muscles, arm strength intact with no bony abnormalities, swelling or sabra deformity      ASSESSMENT/PLAN:     1. Pain of right shoulder region  - continue with previous plan with minor medication adjustment, DC  diclofenac   - methylPREDNISolone (MEDROL DOSEPAK) 4 MG tablet; Follow package instructions  Dispense: 21 tablet; Refill: 0  - if no improvement and continued inability to perform PT exercises needs to see sport med   - follow up if symptoms worsen or fail to improve   - pt agreed to above plan and all questions are answered     Tanika Gregorio PA-C  [facility]

## 2018-01-19 NOTE — PATIENT INSTRUCTIONS
Stop diclofenac     Start medrol dose pack     Continue with physical therapy    If no improvement, notify us, sport med referral

## 2018-01-19 NOTE — NURSING NOTE
"Chief Complaint   Patient presents with     Neck Pain       Initial BP (!) 158/94  Pulse 86  Temp 98.1  F (36.7  C) (Oral)  Wt 185 lb (83.9 kg)  SpO2 98%  BMI 28.13 kg/m2 Estimated body mass index is 28.13 kg/(m^2) as calculated from the following:    Height as of 1/11/18: 5' 8\" (1.727 m).    Weight as of this encounter: 185 lb (83.9 kg).  Medication Reconciliation: complete   Chel Price, DARVIN      "

## 2018-01-21 ENCOUNTER — HEALTH MAINTENANCE LETTER (OUTPATIENT)
Age: 48
End: 2018-01-21

## 2018-01-30 ENCOUNTER — THERAPY VISIT (OUTPATIENT)
Dept: PHYSICAL THERAPY | Facility: CLINIC | Age: 48
End: 2018-01-30
Payer: COMMERCIAL

## 2018-01-30 DIAGNOSIS — M54.12 RIGHT CERVICAL RADICULOPATHY: ICD-10-CM

## 2018-01-30 PROCEDURE — 97110 THERAPEUTIC EXERCISES: CPT | Mod: GP | Performed by: PHYSICAL THERAPIST

## 2018-01-30 PROCEDURE — 97112 NEUROMUSCULAR REEDUCATION: CPT | Mod: GP | Performed by: PHYSICAL THERAPIST

## 2018-01-30 NOTE — MR AVS SNAPSHOT
After Visit Summary   1/30/2018    Kassie Gallardo    MRN: [id number]           Patient Information     Date Of Birth          1970        Visit Information        Provider Department      1/30/2018 8:50 AM Lia Marley PT [facility]        Today's Diagnoses     Right cervical radiculopathy           Follow-ups after your visit        Your next 10 appointments already scheduled     Feb 06, 2018  8:50 AM CST   JOMAR Extremity with Lia Marley PT   [facility] ([facility]  )    [address] [place] [address]  [facility] [address]   [phone]            Feb 08, 2018  8:50 AM CST   JOMAR Extremity with Lia Marley PT   [facility] ([facility]  )    [address] [place] [address]  [facility] [id number] [phone]            Feb 13, 2018  8:50 AM CST   JOMAR Extremity with Lia Marley PT   [facility] ([facility]  )    [address] [place] [address]  [facility] [id number] [phone]            Feb 15, 2018  8:50 AM CST   JOMAR Extremity with Lia Marley PT   [facility] ([facility]  )    [address] [place] [address]  [facility] [id number] [phone]              Who to contact     If you have questions or need follow up information about today's clinic visit or your schedule please contact [facility] directly at [phone].  Normal or non-critical lab and imaging results will be communicated to you by MyChart, letter or phone within 4 business days after the clinic has received the results. If you do not hear from us within 7 days, please contact the clinic through MyChart or phone. If you have a critical or abnormal lab result, we will notify you by phone as  "soon as possible.  Submit refill requests through Breezy or call your pharmacy and they will forward the refill request to us. Please allow 3 business days for your refill to be completed.          Additional Information About Your Visit        [employer] Information     Breezy lets you send messages to your doctor, view your test results, renew your prescriptions, schedule appointments and more. To sign up, go to [email or web address] . Click on \"Log in\" on the left side of the screen, which will take you to the Welcome page. Then click on \"Sign up Now\" on the right side of the page.     You will be asked to enter the access code listed below, as well as some personal information. Please follow the directions to create your username and password.     Your access code is: [email or web address]  Expires: 4/3/2018  2:09 PM     Your access code will  in 90 days. If you need help or a new code, please call your [place] clinic or [phone].        Care EveryWhere ID     This is your Care EveryWhere ID. This could be used by other organizations to access your [place] medical records  [id number]         Blood Pressure from Last 3 Encounters:   18 145/89   18 (!) 174/100   18 (!) 172/95    Weight from Last 3 Encounters:   18 83.9 kg (185 lb)   18 83.5 kg (184 lb)   18 84.2 kg (185 lb 9.6 oz)              We Performed the Following     NEUROMUSCULAR RE-EDUCATION     THERAPEUTIC EXERCISES        Primary Care Provider Office Phone # Fax #    Ronak Lyon [phone]       [address] [place] [address]        Equal Access to Services     SARAH SAAVEDRA : Chao Rader, renzo heck, danielle méndez, meliton waterman. [facility] [phone].    ATENCIÓN: Si habla español, tiene a chairez disposición servicios gratuitos de asistencia lingüística. Llame al [phone].    We comply with applicable federal civil " rights laws and [place] laws. We do not discriminate on the basis of race, color, national origin, age, disability, sex, sexual orientation, or gender identity.            Thank you!     Thank you for choosing [facility]ERAP[facility]  for your care. Our goal is always to provide you with excellent care. Hearing back from our patients is one way we can continue to improve our services. Please take a few minutes to complete the written survey that you may receive in the mail after your visit with us. Thank you!             Your Updated Medication List - Protect others around you: Learn how to safely use, store and throw away your medicines at www.disposemymeds.org.          This list is accurate as of 1/30/18  9:30 AM.  Always use your most recent med list.                   Brand Name Dispense Instructions for use Diagnosis    methocarbamol 750 MG tablet    ROBAXIN    28 tablet    Take 1 tablet (750 mg) by mouth 4 times daily as needed    Pain of right scapula, Acute pain of right shoulder       methylPREDNISolone 4 MG tablet    MEDROL DOSEPAK    21 tablet    Follow package instructions    Pain of right shoulder region

## 2018-04-03 ENCOUNTER — TELEPHONE (OUTPATIENT)
Dept: NURSING | Facility: CLINIC | Age: 48
End: 2018-04-03

## 2018-04-03 NOTE — TELEPHONE ENCOUNTER
Hi  As you may be aware [place] is conducting a personalized medicine hypertension clinical trial (PGEN.)  We are enrolling patients with new onset hypertension or uncontrolled hypertension on one blood pressure medicine.      During a recent office visit this patient was identified as a potential candidate via a BPA alert that detects multiple high blood pressure readings at recent clinic visits.    Would you be ok with our team will be reaching out to this patient to invite her to participate?  At the first study visit the patient would see a pharmacist or provider and have blood pressure retested. If blood pressure is high again at that visit we would make a diagnosis of hypertension (if needed) and we would offer enrollment in the study.      Details of there study can be found by typing <dot>[email or web address].  For your convenience I have copied and pasted the materials below     Please respond to this message with your preference and we would perform all the outreach and scheduling.  No other action would be needed on your part after you send us back your response. If we do not hear from you in one week we will reach out to the patient to gauge her interest in the study.    Yajaira Condon PharmD    And    Sandra Hardy MD  [facility] principle investigator    =======================================================    PGen Hypertension Study Summary     Thank you for your interest in the [facility] hypertension research study. This study is designed to test whether genetically guided blood pressure medication management is better than the standard of care.  Both groups will use medications that have been used for many years to treat high blood pressure ( diuretics, beta blockers, calcium channel blockers, AceI /ARB).  The ORDER of medications chosen may be different however. All participants will receive the genetic testing for free along with free research related visits.  Participants will not be given the results of their  genetic test results until the END of the study.Participants will also receive compensation totaling about $100 for completing all study visits and surveys.      If you would like to enroll or know more about using your genetics to determine which hypertensive medications may work best for you please contact the research coordinator as [phone] or email [email or web address]    Who may qualify for the study:  1) New diagnosis of high blood pressure but not yet on blood pressure medication.  2) Existing diagnosis of hypertension but blood pressure is  uncontrolled with 1 or less class of medications.   3) Age between 30 and 80  4) BMI between 19-50    Who should NOT be in the study:    1) All patient taking more than 1 class of hypertensive medication are excluded.   2) Documented instance of following conditions    A. Cardiac Disease  i. ICD-10 Code for Coronary Artery Disease - CAD: 410.* -414.*, I25.*  ii. ICD-10 Code for Heart Failure - 428.*, I50.*  iii. ICD-10 Code for Congenital Cardiac Disease - 745.*, -747.*, Q20.*, -Q28.*   B. Kidney Disease        i. ICD-10 Code for Chronic Kidney Disease - CKD:ICD9 585.*and ICD10 N18.*   C. Vascular Disease        i. ICD-10 Code for Peripheral Vascular Disease - 443.9, I73.9        ii. ICD-10 Code for Pulmonary Hypertension - 416.0, 416.8, I27.0, I27.2   D. Secondary Hypertension                     i. ICD-10 Code for Hypertension Secondary to Other Diseases - I15.0-2, I15.8-9  3) Any patient who has chronic medical conditions that  their doctor/provider feels would make them unsafe to participate in a research study where initial choice of blood pressure  medication could be from 1 of these 4 BP classes of medicines: diuretics, beta blockers, calcium channel blockers, AceI /ARB.    Study visit occur at the following clinic locations  North:  1) [place]  2) [place]  3) [place]  4) [place]  5) [place]  6) [place]  7) [place]    South:  1) [facility]  ([place])  2) [place]  3) [place]:  1) [place]    Patient Expectations:    1) Take Hypertension medications  2) Attend scheduled study visits  3) Complete online surveys sent between visits     If enrolled patient is asked to attend 5 to 8 study visits over the next 12 months.

## 2018-04-20 NOTE — TELEPHONE ENCOUNTER
Unable to connect with patient regarding PGen blood pressure study at this time. If interested in the future please contact study team at [phone]

## 2018-11-21 ENCOUNTER — HOSPITAL ENCOUNTER (EMERGENCY)
Facility: CLINIC | Age: 48
Discharge: HOME | End: 2018-11-21
Attending: EMERGENCY MEDICINE | Admitting: EMERGENCY MEDICINE
Payer: COMMERCIAL

## 2018-11-21 ENCOUNTER — APPOINTMENT (OUTPATIENT)
Dept: GENERAL RADIOLOGY | Facility: CLINIC | Age: 48
End: 2018-11-21
Attending: EMERGENCY MEDICINE
Payer: COMMERCIAL

## 2018-11-21 VITALS
RESPIRATION RATE: 14 BRPM | DIASTOLIC BLOOD PRESSURE: 84 MMHG | WEIGHT: 199 LBS | HEIGHT: 69 IN | BODY MASS INDEX: 29.47 KG/M2 | SYSTOLIC BLOOD PRESSURE: 174 MMHG | TEMPERATURE: 97.9 F | OXYGEN SATURATION: 99 %

## 2018-11-21 DIAGNOSIS — S82.892A CLOSED AVULSION FRACTURE OF LEFT ANKLE, INITIAL ENCOUNTER: ICD-10-CM

## 2018-11-21 PROCEDURE — 73610 X-RAY EXAM OF ANKLE: CPT | Mod: LT

## 2018-11-21 PROCEDURE — 27786 TREATMENT OF ANKLE FRACTURE: CPT | Mod: LT

## 2018-11-21 PROCEDURE — 99284 EMERGENCY DEPT VISIT MOD MDM: CPT | Mod: 25

## 2018-11-21 RX ORDER — HYDROCODONE BITARTRATE AND ACETAMINOPHEN 5; 325 MG/1; MG/1
1 TABLET ORAL EVERY 4 HOURS PRN
Qty: 12 TABLET | Refills: 0 | Status: SHIPPED | OUTPATIENT
Start: 2018-11-21

## 2018-11-21 ASSESSMENT — ENCOUNTER SYMPTOMS
ARTHRALGIAS: 1
JOINT SWELLING: 1
NUMBNESS: 0

## 2018-11-21 NOTE — ED AVS SNAPSHOT
Emergency Department    [address] [facility] [address]    Phone:  [phone]    Fax:  [phone]                                       Kassie Gallardo   MRN: [id number]    Department:   Emergency Department   Date of Visit:  11/21/2018           Patient Information     Date Of Birth          1970        Your diagnoses for this visit were:     Closed avulsion fracture of left ankle, initial encounter        You were seen by Inés Franklin MD.      Follow-up Information     Follow up with Clinic, [facility].    Why:  As needed, If symptoms worsen    Contact information:    [address] [address]  [phone]          Follow up with  Emergency Department.    Specialty:  EMERGENCY MEDICINE    Why:  As needed    Contact information:    [address] [facility] [address] [phone]        Discharge Instructions         Ankle Fracture, Distal Fibula  You have a fracture, or broken bone, of the end of the fibula bone. The fibula is one of two bones that support the ankle joint.    Home care    You will be given a splint, cast, or special boot to prevent movement at the injury site. Do not put weight on a splint. It will break. Follow your healthcare provider s advice about when to begin bearing weight on a cast or boot.    Keep your leg raised when sitting or lying down. When sleeping, place a pillow under the injured leg. When sitting, support the injured leg so it is above heart level. This is very important during the first 48 hours.    Keep the cast or splint completely dry at all times. When bathing, protect the cast or splint with 2 large plastic bags. Place 1 bag outside of the other. Tape each bag with duct tape at the top end or use rubber bands. Water can still leak in even when the foot is covered. So it's best to keep the cast, splint, or boot away from water. If a fiberglass cast or splint gets wet, dry it with a hair  dryer on a cool setting.    Place an ice pack over the injured area for no more than 15 to 20 minutes. Do this every 3 to 6 hours for the first 24 to 48 hours. Continue this 3 to 4 times a day as needed. To make an ice pack, put ice cubes in a plastic bag that seals at the top. Wrap the bag in a clean, thin towel or cloth. Never put ice or an ice pack directly on the skin. The ice pack can be put right on the cast or splint. As the ice melts, be careful that the cast or splint doesn t get wet.    You may use over-the-counter pain medicine to control pain, unless another pain medicine was prescribed. If you have chronic liver or kidney disease or ever had a stomach ulcer or GI bleeding, talk with your provider before using these medicines.  Follow-up care  Follow up with your healthcare provider in 1 week, or as advised. This is to be sure the bone is healing properly. If you were given a splint, it may be changed to a cast or boot after the swelling goes down.  If X-rays were taken, you will be told of any new findings that may affect your care.  Date Last Reviewed: 4/1/2018 2000-2018 [employer]. [address] [place] [address], [place] [address]. All rights reserved. This information is not intended as a substitute for professional medical care. Always follow your healthcare professional's instructions.    Opioid Medication Information    You have been given a prescription for an opioid (narcotic) pain medicine and/or have received a pain medicine while here in the Emergency Department. These medicines can make you drowsy or impaired. You must not drive, operate dangerous equipment, or engage in any other dangerous activities while taking these medications. If you drive while taking these medications, you could be arrested for DUI, or driving under the influence. Do not drink any alcohol while you are taking these medications.   Opioid pain medications can cause addiction. If you have a history of chemical  dependency of any type, you are at a higher risk of becoming addicted to pain medications.  Only take these prescribed medications to treat your pain when all other options have been tried. Take it for as short a time and as few doses as possible. Store your pain pills in a secure place, as they are frequently stolen and provide a dangerous opportunity for children or visitors in your house to start abusing these powerful medications. We will not replace any lost or stolen medicine.  As soon as your pain is better, you should flush all your remaining medication.   Many prescription pain medications contain Tylenol  (acetaminophen), including Vicodin , Tylenol #3 , Norco , Lortab , and Percocet .  You should not take any extra pills of Tylenol  if you are using these prescription medications or you can get very sick.  Do not ever take more than 4000 mg of acetaminophen in any 24 hour period.  All opioids tend to cause constipation. Drink plenty of water and eat foods that have a lot of fiber, such as fruits, vegetables, prune juice, apple juice and high fiber cereal.  Take a laxative if you don t move your bowels at least every other day. Miralax , Milk of Magnesia, Colace , or Senna  can be used to keep you regular.              24 Hour Appointment Hotline       To make an appointment at any [facility], call [phone] ([phone]). If you don't have a family doctor or clinic, we will help you find one. [place] clinics are conveniently located to serve the needs of you and your family.             Review of your medicines      START taking        Dose / Directions Last dose taken    HYDROcodone-acetaminophen 5-325 MG tablet   Commonly known as:  NORCO   Dose:  1 tablet   Quantity:  12 tablet        Take 1 tablet by mouth every 4 hours as needed for severe pain   Refills:  0          Our records show that you are taking the medicines listed below. If these are incorrect, please call your family doctor  or clinic.        Dose / Directions Last dose taken    methocarbamol 750 MG tablet   Commonly known as:  ROBAXIN   Dose:  750 mg   Quantity:  28 tablet        Take 1 tablet (750 mg) by mouth 4 times daily as needed   Refills:  0        methylPREDNISolone 4 MG tablet   Commonly known as:  MEDROL DOSEPAK   Quantity:  21 tablet        Follow package instructions   Refills:  0                Information about OPIOIDS     PRESCRIPTION OPIOIDS: WHAT YOU NEED TO KNOW   We gave you an opioid (narcotic) pain medicine. It is important to manage your pain, but opioids are not always the best choice. You should first try all the other options your care team gave you. Take this medicine for as short a time (and as few doses) as possible.    Some activities can increase your pain, such as bandage changes or therapy sessions. It may help to take your pain medicine 30 to 60 minutes before these activities. Reduce your stress by getting enough sleep, working on hobbies you enjoy and practicing relaxation or meditation. Talk to your care team about ways to manage your pain beyond prescription opioids.    These medicines have risks:    DO NOT drive when on new or higher doses of pain medicine. These medicines can affect your alertness and reaction times, and you could be arrested for driving under the influence (DUI). If you need to use opioids long-term, talk to your care team about driving.    DO NOT operate heavy machinery    DO NOT do any other dangerous activities while taking these medicines.    DO NOT drink any alcohol while taking these medicines.     If the opioid prescribed includes acetaminophen, DO NOT take with any other medicines that contain acetaminophen. Read all labels carefully. Look for the word  acetaminophen  or  Tylenol.  Ask your pharmacist if you have questions or are unsure.    You can get addicted to pain medicines, especially if you have a history of addiction (chemical, alcohol or substance dependence).  Talk to your care team about ways to reduce this risk.    All opioids tend to cause constipation. Drink plenty of water and eat foods that have a lot of fiber, such as fruits, vegetables, prune juice, apple juice and high-fiber cereal. Take a laxative (Miralax, milk of magnesia, Colace, Senna) if you don t move your bowels at least every other day. Other side effects include upset stomach, sleepiness, dizziness, throwing up, tolerance (needing more of the medicine to have the same effect), physical dependence and slowed breathing.    Store your pills in a secure place, locked if possible. We will not replace any lost or stolen medicine. If you don t finish your medicine, please throw away (dispose) as directed by your pharmacist. The [place] Pollution Control Agency has more information about safe disposal: https://www.pca.[facility].mn.us/living-green/managing-unwanted-medications        Prescriptions were sent or printed at these locations (1 Prescription)                   Other Prescriptions                Printed at Department/Unit printer (1 of 1)         HYDROcodone-acetaminophen (NORCO) 5-325 MG tablet                Procedures and tests performed during your visit     XR Ankle Left G/E 3 Views      Orders Needing Specimen Collection     None      Pending Results     No orders found from 11/19/2018 to 11/22/2018.            Pending Culture Results     No orders found from 11/19/2018 to 11/22/2018.            Pending Results Instructions     If you had any lab results that were not finalized at the time of your Discharge, you can call the ED Lab Result RN at [phone]. You will be contacted by this team for any positive Lab results or changes in treatment. The nurses are available 7 days a week from 10A to 6:30P.  You can leave a message 24 hours per day and they will return your call.        Test Results From Your Hospital Stay        11/21/2018  9:18 PM      Narrative     LEFT ANKLE THREE VIEWS   11/21/2018  9:06 PM     HISTORY: Rolled left ankle, missed step.    COMPARISON: None.        Impression     IMPRESSION: Lateral soft tissue swelling is present. There is a tiny  chip fracture between the talus and the distal fibula which could be  off either of these bones. Ankle mortise appears intact. No other  abnormalities.    MANINDER RODRIGUEZ MD                Clinical Quality Measure: Blood Pressure Screening     Your blood pressure was checked while you were in the emergency department today. The last reading we obtained was  BP: (!) 185/97 . Please read the guidelines below about what these numbers mean and what you should do about them.  If your systolic blood pressure (the top number) is less than 120 and your diastolic blood pressure (the bottom number) is less than 80, then your blood pressure is normal. There is nothing more that you need to do about it.  If your systolic blood pressure (the top number) is 120-139 or your diastolic blood pressure (the bottom number) is 80-89, your blood pressure may be higher than it should be. You should have your blood pressure rechecked within a year by a primary care provider.  If your systolic blood pressure (the top number) is 140 or greater or your diastolic blood pressure (the bottom number) is 90 or greater, you may have high blood pressure. High blood pressure is treatable, but if left untreated over time it can put you at risk for heart attack, stroke, or kidney failure. You should have your blood pressure rechecked by a primary care provider within the next 4 weeks.  If your provider in the emergency department today gave you specific instructions to follow-up with your doctor or provider even sooner than that, you should follow that instruction and not wait for up to 4 weeks for your follow-up visit.        Thank you for choosing [facility]       Thank you for choosing [place] for your care. Our goal is always to provide you with excellent care. Hearing back from our  "patients is one way we can continue to improve our services. Please take a few minutes to complete the written survey that you may receive in the mail after you visit with us. Thank you!        [employer] Information     Fiksu lets you send messages to your doctor, view your test results, renew your prescriptions, schedule appointments and more. To sign up, go to [email or web address] . Click on \"Log in\" on the left side of the screen, which will take you to the Welcome page. Then click on \"Sign up Now\" on the right side of the page.     You will be asked to enter the access code listed below, as well as some personal information. Please follow the directions to create your username and password.     Your access code is: [email or web address]  Expires: 2019  9:47 PM     Your access code will  in 90 days. If you need help or a new code, please call your [place] clinic or [phone].        Care EveryWhere ID     This is your Care EveryWhere ID. This could be used by other organizations to access your [place] medical records  [id number]        Equal Access to Services     SUSI SAAVEDRA : Hadmague Rader, renzo heck, danielle méndez, meliton kwon . [facility] [phone].    ATENCIÓN: Si habla español, tiene a chairez disposición servicios gratuitos de asistencia lingüística. Opal al [phone].    We comply with applicable federal civil rights laws and [place] laws. We do not discriminate on the basis of race, color, national origin, age, disability, sex, sexual orientation, or gender identity.            After Visit Summary       This is your record. Keep this with you and show to your community pharmacist(s) and doctor(s) at your next visit.                  "

## 2018-11-21 NOTE — ED AVS SNAPSHOT
Emergency Department    [address] [place] [id number]    Phone:  [phone]    Fax:  [phone]                                       Kassie Gallardo   MRN: [id number]    Department:   Emergency Department   Date of Visit:  11/21/2018           After Visit Summary Signature Page     I have received my discharge instructions, and my questions have been answered. I have discussed any challenges I see with this plan with the nurse or doctor.    ..........................................................................................................................................  Patient/Patient Representative Signature      ..........................................................................................................................................  Patient Representative Print Name and Relationship to Patient    ..................................................               ................................................  Date                                   Time    ..........................................................................................................................................  Reviewed by Signature/Title    ...................................................              ..............................................  Date                                               Time          22EPIC Rev 08/18

## 2018-11-22 NOTE — ED PROVIDER NOTES
"  History     Chief Complaint:  Ankle pain    HPI   Kassie Gallardo is a 48 year old female who presents for evaluation of left ankle pain after inversion injury last night. The patient reports walking down the steps in the dark when she missed the last step and injured her ankle. She has been able to ambulate, but this is painful. The patient notes ankle ankle swelling, but she has taken ibuprofen, used ice, and elevated the ankle immediately after the injury occurred. No numbness or tingling.     Allergies:  No Known Drug Allergies      Medications:    Robaxin   Medrol Dosepak      Past Medical History:    Right cervical radiculopathy   Cervical cancer     Past Surgical History:    Hysterectomy     Family History:    Suicide, PTSD - brother     Social History:  The patient presented to the ED alone.  Smoking Status: current, 0.50 ppd  Smokeless Tobacco: never  Alcohol Use: no   Marital Status:   [2]     Review of Systems   Musculoskeletal: Positive for arthralgias (left ankle) and joint swelling.   Neurological: Negative for numbness.     Physical Exam     Patient Vitals for the past 24 hrs:   BP Temp Temp src Heart Rate Resp SpO2 Height Weight   11/21/18 1937 (!) 185/97 97.9  F (36.6  C) Oral 92 14 100 % 1.753 m (5' 9\") 90.3 kg (199 lb)      Physical Exam  General/Appearance: appears stated age, well-groomed, appears comfortable  Eyes: EOMI, no scleral injection, no icterus  ENT: MMM  MSK: RUSSELL, good tone, significant ttp to distal and anterior L lateral malleolus, no ttp over forefoot/midfoot  Skin: warm and well-perfused, no rash, significant edema and ecchymosis to L lateral malleolus and forefoot  Neuro: GCS 15, alert and oriented, no gross focal neuro deficits - nl sensation to L distal foot      Emergency Department Course     Imaging:  Radiology findings were communicated with the patient who voiced understanding of the findings.    XR Ankle Left G/E 3 Views  Final Result  Lateral soft tissue swelling is " present. There is a tiny  chip fracture between the talus and the distal fibula which could be  off either of these bones. Ankle mortise appears intact. No other  abnormalities.  MANINDER RODRIGUEZ MD      Emergency Department Course:  Nursing notes and vitals reviewed.  I entered the room.  I performed an exam of the patient as documented above.     The patient was sent for X-ray while in the emergency department, results above.     2105 the patient was rechecked and updated regarding the results of the imaging studies.      I discussed the treatment plan with the patient. They expressed understanding of this plan and consented to discharge. They will be discharged home with instructions for care and follow up. In addition, the patient will return to the emergency department if their symptoms worsen, if new symptoms arise or if there is any concern.  All questions were answered.     Impression & Plan      Medical Decision Making:  Kassie Gallardo is a 48 year old female who presents to the emergency department today for evaluation of left twisted ankle yesterday.  She inverted her left foot with subsequent left lateral malleoli or pain.  There is significant edema and ecchymosis over the left lateral malleolus.  X-ray shows very small avulsion fracture.  This is nonsurgical.  She will be treated with a walking boot and crutches as needed.  She will also be given pain medication in addition to using ice, elevation, ibuprofen.    Diagnosis:    ICD-10-CM    1. Closed avulsion fracture of left ankle, initial encounter S82.892A      Disposition:   The patient was discharged to home.    Discharge Medications:  New Prescriptions    HYDROCODONE-ACETAMINOPHEN (NORCO) 5-325 MG TABLET    Take 1 tablet by mouth every 4 hours as needed for severe pain     Scribe Disclosure:  SARAHY, Alonzo Gregorio, am serving as a scribe at 9:17 PM on 11/21/2018 to document services personally performed by Inés Franklin, based on my observations and the  provider's statements to me.    EMERGENCY DEPARTMENT       Inés Franklin MD  11/21/18 5231

## 2018-11-22 NOTE — DISCHARGE INSTRUCTIONS
Ankle Fracture, Distal Fibula  You have a fracture, or broken bone, of the end of the fibula bone. The fibula is one of two bones that support the ankle joint.    Home care    You will be given a splint, cast, or special boot to prevent movement at the injury site. Do not put weight on a splint. It will break. Follow your healthcare provider s advice about when to begin bearing weight on a cast or boot.    Keep your leg raised when sitting or lying down. When sleeping, place a pillow under the injured leg. When sitting, support the injured leg so it is above heart level. This is very important during the first 48 hours.    Keep the cast or splint completely dry at all times. When bathing, protect the cast or splint with 2 large plastic bags. Place 1 bag outside of the other. Tape each bag with duct tape at the top end or use rubber bands. Water can still leak in even when the foot is covered. So it's best to keep the cast, splint, or boot away from water. If a fiberglass cast or splint gets wet, dry it with a hair dryer on a cool setting.    Place an ice pack over the injured area for no more than 15 to 20 minutes. Do this every 3 to 6 hours for the first 24 to 48 hours. Continue this 3 to 4 times a day as needed. To make an ice pack, put ice cubes in a plastic bag that seals at the top. Wrap the bag in a clean, thin towel or cloth. Never put ice or an ice pack directly on the skin. The ice pack can be put right on the cast or splint. As the ice melts, be careful that the cast or splint doesn t get wet.    You may use over-the-counter pain medicine to control pain, unless another pain medicine was prescribed. If you have chronic liver or kidney disease or ever had a stomach ulcer or GI bleeding, talk with your provider before using these medicines.  Follow-up care  Follow up with your healthcare provider in 1 week, or as advised. This is to be sure the bone is healing properly. If you were given a splint, it may  be changed to a cast or boot after the swelling goes down.  If X-rays were taken, you will be told of any new findings that may affect your care.  Date Last Reviewed: 4/1/2018 2000-2018 [employer] [address] [place] [address], [place] [address]. All rights reserved. This information is not intended as a substitute for professional medical care. Always follow your healthcare professional's instructions.    Opioid Medication Information    You have been given a prescription for an opioid (narcotic) pain medicine and/or have received a pain medicine while here in the Emergency Department. These medicines can make you drowsy or impaired. You must not drive, operate dangerous equipment, or engage in any other dangerous activities while taking these medications. If you drive while taking these medications, you could be arrested for DUI, or driving under the influence. Do not drink any alcohol while you are taking these medications.   Opioid pain medications can cause addiction. If you have a history of chemical dependency of any type, you are at a higher risk of becoming addicted to pain medications.  Only take these prescribed medications to treat your pain when all other options have been tried. Take it for as short a time and as few doses as possible. Store your pain pills in a secure place, as they are frequently stolen and provide a dangerous opportunity for children or visitors in your house to start abusing these powerful medications. We will not replace any lost or stolen medicine.  As soon as your pain is better, you should flush all your remaining medication.   Many prescription pain medications contain Tylenol  (acetaminophen), including Vicodin , Tylenol #3 , Norco , Lortab , and Percocet .  You should not take any extra pills of Tylenol  if you are using these prescription medications or you can get very sick.  Do not ever take more than 4000 mg of acetaminophen in any 24 hour period.  All  opioids tend to cause constipation. Drink plenty of water and eat foods that have a lot of fiber, such as fruits, vegetables, prune juice, apple juice and high fiber cereal.  Take a laxative if you don t move your bowels at least every other day. Miralax , Milk of Magnesia, Colace , or Senna  can be used to keep you regular.

## 2019-01-25 PROBLEM — M54.12 RIGHT CERVICAL RADICULOPATHY: Status: RESOLVED | Noted: 2018-01-16 | Resolved: 2019-01-25

## 2019-01-25 NOTE — PROGRESS NOTES
Subjective:  HPI                    Objective:  System    Physical Exam    General     ROS    Assessment/Plan:    DISCHARGE REPORT    Progress reporting period is from IE to 1-30-18 (seen for 2 PT visits).     SUBJECTIVE  Subjective: Much better since chin tucks and prednisone pack last week. Sleeplessness improved from 3 hours to 1 hour. Sitting tolerance improved from 5 to 30 minutes. No longer has constant burning in R scapula, now intermittent. Sharp pains in R scapula are less frequent.   Current Pain level: 0/10   Initial Pain level: (4-10/10)   Changes in function: Yes, see goal flow sheet for change in function   Adverse reactions: None;   ,     Patient has failed to return to therapy so current objective findings are unknown.  The subjective and objective information are from the last SOAP note on this patient.    OBJECTIVE  Objective: CAROM: flex=100% (no pain, feels tight), ext=75% (mild burning in R scapula, feels tight), OF=602%, XA=107%. Progressed to seated retr/ext every 2 hours. Had improved motion and decreased R scapular burning at end range ext.      ASSESSMENT/PLAN  Updated problem list and treatment plan: Diagnosis 1:  R scapular pain    STG/LTGs have been met or progress has been made towards goals:  Yes (See Goal flow sheet.)  Assessment of Progress: The patient has not returned to therapy. Current status is unknown.  Self Management Plans:  Patient has been instructed in a home treatment program.  Patient  has been instructed in self management of symptoms.  I have re-evaluated this patient and find that the nature, scope, duration and intensity of the therapy is appropriate for the medical condition of the patient.  Kassie continues to require the following intervention to meet STG and LTG's: PT intervention is no longer required to meet STG/LTG.  The patient failed to complete scheduled/ordered appointments so current information is unknown.  We will discharge this patient from  PT.    Recommendations:  This patient is ready to be discharged from therapy and continue their home treatment program.    Please refer to the daily flowsheet for treatment today, total treatment time and time spent performing 1:1 timed codes.
